# Patient Record
(demographics unavailable — no encounter records)

---

## 2025-07-07 NOTE — PHYSICAL EXAM
[de-identified] : PHYSICAL EXAM LEFT  SHOULDER  NECK EXAM  FROM NONTENDER  SPURLING  RIGHT=NEG, LEFT=NEG  SCAPULAR PROTRACTION AROM 140 / 140 / 90 / 10 TENDER: SA REGION LATERAL   SPECIAL TESTING : HDZ - POSITIVE  SVEN - POSITIVE  SPEED TEST - POSITIVE  GARCIA - NEGATIVE  APPREHENSION AND SUPPRESSION - NEGATIVE   RC STRENGTH TESTING  SS:  5/5 SUB 5/5 IS     5/5 BICEPS  5/5  SENSATION  - GROSSLY INTACT    [de-identified] : I ORDERED XRAYS OF SHOULDER TO EVALUATE PAINFUL SYMPTOMS  LEFT SHOULDER XRAY (2 VIEWS - AP AND OUTLET) -  NO OBVIOUS FRACTURE , SEPARATION OR DISLOCATION GRADE 1-2  OSTEOARTHRITIS, TYPE 3B ACROMION  8MM CALCIFICATION CSA= 41.1

## 2025-07-07 NOTE — HISTORY OF PRESENT ILLNESS
[de-identified] : PATIENT COMPLAINS OF LEFT SHOUDLER RHD PAIN BEGAN JANUARY 2025 PAIN   7/10  DESCRIPTION OF PAIN: SHARP, ACHY WORSE AT PARTICULAR TIME OF DAY: WORSE AT NIGHT PRIOR TREATMENT: REST   PREVIOUS DISLOCATION:NONE HAS HAD PREVIOUS IMAGING -  NO HAS HAD PHYSICAL THERAPY WITHOUT RELIEF-  NO HAS HAD PREVIOUS SURGERY- NO HAS HAD PREVIOUS INJECTION .- NO  2017 RIGHT ROATOR CUFF TEAR

## 2025-07-07 NOTE — DISCUSSION/SUMMARY
[de-identified] : ULTRASOUND SHOULDER PERFORMED , EVALUATED, DOCUMENTED - AND REVIEWED WITH PATIENT EVALUATION  REVEALS INFLAMMATORY CHANGES WITHOUT SIGNIFICANT TENDON TEAR  PATIENT HAS ELECTED TO PROCEED WITH KENALOG INJECTION SHOULDER RISKS AND BENEFITS DISCUSSED - VERBAL CONSENT OBTAINED SEE PROCEDURE NOTE     POST INJECTION INSTRUCTIONS:   INJECTION THERAPY HANDOUT PROVIDED   COLD THERAPY , ANALGESICS PRN    START P.T.  WITHIN 2 WEEKS AFTER INJECTION - 2 X 4 WEEKS - PROGRESS TO HOME EXERCISES    MRI IF NO RELIEF 12 WEEKS

## 2025-07-07 NOTE — PHYSICAL EXAM
[de-identified] : PHYSICAL EXAM LEFT  SHOULDER  NECK EXAM  FROM NONTENDER  SPURLING  RIGHT=NEG, LEFT=NEG  SCAPULAR PROTRACTION AROM 140 / 140 / 90 / 10 TENDER: SA REGION LATERAL   SPECIAL TESTING : HDZ - POSITIVE  SVEN - POSITIVE  SPEED TEST - POSITIVE  GARCIA - NEGATIVE  APPREHENSION AND SUPPRESSION - NEGATIVE   RC STRENGTH TESTING  SS:  5/5 SUB 5/5 IS     5/5 BICEPS  5/5  SENSATION  - GROSSLY INTACT    [de-identified] : I ORDERED XRAYS OF SHOULDER TO EVALUATE PAINFUL SYMPTOMS  LEFT SHOULDER XRAY (2 VIEWS - AP AND OUTLET) -  NO OBVIOUS FRACTURE , SEPARATION OR DISLOCATION GRADE 1-2  OSTEOARTHRITIS, TYPE 3B ACROMION  8MM CALCIFICATION CSA= 41.1

## 2025-07-07 NOTE — DISCUSSION/SUMMARY
[de-identified] : ULTRASOUND SHOULDER PERFORMED , EVALUATED, DOCUMENTED - AND REVIEWED WITH PATIENT EVALUATION  REVEALS INFLAMMATORY CHANGES WITHOUT SIGNIFICANT TENDON TEAR  PATIENT HAS ELECTED TO PROCEED WITH KENALOG INJECTION SHOULDER RISKS AND BENEFITS DISCUSSED - VERBAL CONSENT OBTAINED SEE PROCEDURE NOTE     POST INJECTION INSTRUCTIONS:   INJECTION THERAPY HANDOUT PROVIDED   COLD THERAPY , ANALGESICS PRN    START P.T.  WITHIN 2 WEEKS AFTER INJECTION - 2 X 4 WEEKS - PROGRESS TO HOME EXERCISES    MRI IF NO RELIEF 12 WEEKS

## 2025-07-07 NOTE — HISTORY OF PRESENT ILLNESS
[de-identified] : PATIENT COMPLAINS OF LEFT SHOUDLER RHD PAIN BEGAN JANUARY 2025 PAIN   7/10  DESCRIPTION OF PAIN: SHARP, ACHY WORSE AT PARTICULAR TIME OF DAY: WORSE AT NIGHT PRIOR TREATMENT: REST   PREVIOUS DISLOCATION:NONE HAS HAD PREVIOUS IMAGING -  NO HAS HAD PHYSICAL THERAPY WITHOUT RELIEF-  NO HAS HAD PREVIOUS SURGERY- NO HAS HAD PREVIOUS INJECTION .- NO  2017 RIGHT ROATOR CUFF TEAR